# Patient Record
Sex: MALE | Race: WHITE | URBAN - NONMETROPOLITAN AREA
[De-identification: names, ages, dates, MRNs, and addresses within clinical notes are randomized per-mention and may not be internally consistent; named-entity substitution may affect disease eponyms.]

---

## 2017-11-11 ENCOUNTER — NURSE TRIAGE (OUTPATIENT)
Dept: ADMINISTRATIVE | Age: 67
End: 2017-11-11

## 2017-11-11 NOTE — TELEPHONE ENCOUNTER
Reason for Disposition   MILD vomiting with diarrhea (all triage questions negative)    Answer Assessment - Initial Assessment Questions  1. VOMITING SEVERITY: \"How many times have you vomited in the past 24 hours? \"      - MILD:  1 - 2 times/day     - MODERATE: 3 - 5 times/day, decreased oral intake without significant weight loss or symptoms of dehydration     - SEVERE: 6 or more times/day, vomits everything or nearly everything, with significant weight loss, symptoms of dehydration       Vomited 3 times. Moderate. 2. ONSET: \"When did the vomiting begin? \"      This morning. 3. FLUIDS: \"What fluids or food have you vomited up today? \" \"Have you been able to keep any fluids down? \"      7-up. 4. ABDOMINAL PAIN: \"Are your having any abdominal pain? \" If yes : \"How bad is it and what does it feel like? \" (e.g., crampy, dull, intermittent, constant)       No.   5. DIARRHEA: \"Is there any diarrhea? \" If so, ask: \"How many times today? \"       Onset last night. Diarrhea once or twice. 6. CONTACTS: \"Is there anyone else in the family with the same symptoms? \"     No   7. CAUSE: \"What do you think is causing your vomiting? \"      Unknown   8. HYDRATION STATUS: \"Any signs of dehydration? \" (e.g., dry mouth [not only dry lips], too weak to stand) \"When did you last urinate? \"      Last urinated this morning. 9. OTHER SYMPTOMS: \"Do you have any other symptoms? \" (e.g., fever, headache, vertigo, vomiting blood or coffee grounds)      Vomiting. 10. PREGNANCY: \"Is there any chance you are pregnant? \" \"When was your last menstrual period? \"      Male    Protocols used: [facility] AND [facility]

## 2018-05-10 ENCOUNTER — NURSE TRIAGE (OUTPATIENT)
Dept: ADMINISTRATIVE | Age: 68
End: 2018-05-10

## 2018-11-19 ENCOUNTER — NURSE TRIAGE (OUTPATIENT)
Dept: ADMINISTRATIVE | Age: 68
End: 2018-11-19